# Patient Record
Sex: FEMALE | Race: WHITE | Employment: FULL TIME | URBAN - NONMETROPOLITAN AREA
[De-identification: names, ages, dates, MRNs, and addresses within clinical notes are randomized per-mention and may not be internally consistent; named-entity substitution may affect disease eponyms.]

---

## 2024-01-31 ENCOUNTER — APPOINTMENT (OUTPATIENT)
Dept: RADIOLOGY | Facility: CLINIC | Age: 69
End: 2024-01-31
Payer: MEDICARE

## 2024-01-31 ENCOUNTER — OFFICE VISIT (OUTPATIENT)
Dept: URGENT CARE | Facility: CLINIC | Age: 69
End: 2024-01-31
Payer: MEDICARE

## 2024-01-31 VITALS
HEIGHT: 59 IN | HEART RATE: 68 BPM | DIASTOLIC BLOOD PRESSURE: 63 MMHG | TEMPERATURE: 98.7 F | SYSTOLIC BLOOD PRESSURE: 112 MMHG | WEIGHT: 145 LBS | RESPIRATION RATE: 18 BRPM | BODY MASS INDEX: 29.23 KG/M2 | OXYGEN SATURATION: 100 %

## 2024-01-31 DIAGNOSIS — M79.672 LEFT FOOT PAIN: ICD-10-CM

## 2024-01-31 DIAGNOSIS — M79.672 LEFT FOOT PAIN: Primary | ICD-10-CM

## 2024-01-31 DIAGNOSIS — S92.355A CLOSED NONDISPLACED FRACTURE OF FIFTH METATARSAL BONE OF LEFT FOOT, INITIAL ENCOUNTER: ICD-10-CM

## 2024-01-31 PROCEDURE — 73630 X-RAY EXAM OF FOOT: CPT

## 2024-01-31 PROCEDURE — G0463 HOSPITAL OUTPT CLINIC VISIT: HCPCS

## 2024-01-31 PROCEDURE — 99204 OFFICE O/P NEW MOD 45 MIN: CPT

## 2024-01-31 RX ORDER — ESCITALOPRAM OXALATE 5 MG/1
5 TABLET ORAL DAILY
COMMUNITY

## 2024-01-31 RX ORDER — EVENING PRIMROSE OIL 500 MG
100 CAPSULE ORAL DAILY
COMMUNITY

## 2024-01-31 NOTE — LETTER
January 31, 2024     Patient: Sabine Romero   YOB: 1955   Date of Visit: 1/31/2024       To Whom it May Concern:    Sabine Romero was seen in my clinic on 1/31/2024. She may return to work as scheduled, but must be non-weightbearing due fracture in left foot.    If you have any questions or concerns, please don't hesitate to call.         Sincerely,          Reg Sullivan PA-C        CC: No Recipients

## 2024-01-31 NOTE — PROGRESS NOTES
"  [facility]        NAME: Sabine Romero is a 68 y.o. female  : 1955    MRN: [id number]  DATE: 2024  TIME: 12:46 PM    Assessment and Plan   Left foot pain [M79.672]  1. Left foot pain  XR foot 3+ vw left      2. Closed nondisplaced fracture of fifth metatarsal bone of left foot, initial encounter  Ambulatory Referral to Podiatry        There appears to be a fracture of the left fifth metatarsal of the mid to distal shaft.  Nondisplaced.  There also appears to be a small fracture towards the more proximal base of the fifth metatarsal.  Official read pending at time of discharge.  Orthopedic boot placed i and nstructed to be nonweightbearing.  Patient refused walker here in the clinic because she \"has a walker at home\".  Referral to podiatry given as well.  Recommended ibuprofen, ice, elevation.  Advised to follow-up with family doctor.  Go to the emergency room if any symptoms worsen.    Patient Instructions     Ibuprofen for pain/inflammation.  Make sure to take with food.  May alternate with Tylenol as needed.  Elevate foot with 1-2 pillows when resting.  Wear orthopedic boot and use walker as instructed-do not bear weight on left foot.  Follow-up with podiatry as instructed.  May apply ice 3-4 times a day for 10 to 15 minutes.  Use insulation around ice.  Go to the emergency room if any symptoms worsen.  Follow up with PCP in 3-5 days.  Proceed to  ER if symptoms worsen.    Chief Complaint     Chief Complaint   Patient presents with    Fall    Foot Pain     Left foot pain fell yesterday got her foot wrapped around the vacuum cord she twisted it pain is on the left side of foot and on the top and unable to get a shoe on.          History of Present Illness       68-year-old female presents to the clinic for left dorsal/lateral foot pain that began yesterday.  PT states she was vacuuming when the cord wrapped around her foot and she fell.  She did not fully fall to the ground, " "she was able to catch herself on a nearby object.  States that her foot twisted outwards.  No prior injuries or surgeries.  Applied ice and used a topical numbing cream.  Pain worse with ambulation.  Denies numbness, tingling, fever, chills, head injury, loss of consciousness.    Fall        Review of Systems   Review of Systems   Constitutional: Negative.    Respiratory: Negative.     Cardiovascular: Negative.    Musculoskeletal:  Positive for arthralgias and joint swelling.   Skin:         Bruising on foot.   Neurological: Negative.          Current Medications       Current Outpatient Medications:     escitalopram (LEXAPRO) 5 mg tablet, Take 5 mg by mouth daily, Disp: , Rfl:     Vitamin E 45 MG (100 UNIT) CAPS, Take 100 Units by mouth daily, Disp: , Rfl:     Current Allergies     Allergies as of 01/31/2024 - Reviewed 01/31/2024   Allergen Reaction Noted    Codeine Other (See Comments) 07/13/2020            The following portions of the patient's history were reviewed and updated as appropriate: allergies, current medications, past family history, past medical history, past social history, past surgical history and problem list.     History reviewed. No pertinent past medical history.    History reviewed. No pertinent surgical history.    History reviewed. No pertinent family history.      Medications have been verified.        Objective   /63   Pulse 68   Temp 98.7 °F (37.1 °C)   Resp 18   Ht 4' 11\" (1.499 m)   Wt 65.8 kg (145 lb)   SpO2 100%   BMI 29.29 kg/m²        Physical Exam     Physical Exam  Constitutional:       General: She is not in acute distress.     Appearance: Normal appearance. She is not ill-appearing, toxic-appearing or diaphoretic.   HENT:      Head: Normocephalic and atraumatic.   Cardiovascular:      Rate and Rhythm: Normal rate and regular rhythm.      Pulses: Normal pulses.      Heart sounds: Normal heart sounds.   Pulmonary:      Effort: Pulmonary effort is normal. No " respiratory distress.      Breath sounds: Normal breath sounds.   Musculoskeletal:      Cervical back: Normal range of motion and neck supple. No rigidity or tenderness.      Right ankle: Normal.      Right Achilles Tendon: Normal.      Left ankle: Normal.      Left Achilles Tendon: Normal.      Right foot: Normal.      Left foot: Normal range of motion and normal capillary refill. Swelling (minimal) and tenderness (There is mild to moderate tenderness to palpation over the dorsal mid left foot mostly on the lateral aspect.  tenderness to palpation over the mid to distal aspect of the left fifth metatarsal.  No deformity.  Mild limp due to pain.  No open wound.) present. No deformity. Normal pulse.        Legs:    Skin:     General: Skin is warm and dry.      Capillary Refill: Capillary refill takes less than 2 seconds.      Findings: Bruising (on dorsal/lateral aspect of left foot - mild) present.   Neurological:      General: No focal deficit present.      Mental Status: She is alert and oriented to person, place, and time.   Psychiatric:         Mood and Affect: Mood normal.

## 2024-01-31 NOTE — PATIENT INSTRUCTIONS
Ibuprofen for pain/inflammation.  Make sure to take with food.  May alternate with Tylenol as needed.  Elevate foot with 1-2 pillows when resting.  Wear orthopedic boot and use walker as instructed-do not bear weight on left foot.  Follow-up with podiatry as instructed.  May apply ice 3-4 times a day for 10 to 15 minutes.  Use insulation around ice.  Go to the emergency room if any symptoms worsen.  Follow up with PCP in 3-5 days.  Proceed to  ER if symptoms worsen.  Foot Fracture in Adults   WHAT YOU NEED TO KNOW:   A foot fracture is a break in a bone in your foot.       DISCHARGE INSTRUCTIONS:   Call your local emergency number (911 in the [place]) if:   You suddenly feel lightheaded and short of breath.    You have chest pain when you take a deep breath or cough.    You cough up blood.    Return to the emergency department if:   The pain in your injured foot gets worse even after you rest and take pain medicine.    The skin or toes of your foot become numb, swollen, cold, white, or blue.    You have more pain or swelling than you did before a cast was put on.    Your leg feels warm, tender, and painful. It may look swollen and red.    Call your doctor if:   You have a fever.    You have new sores around your boot, cast, or splint.    You have new or worsening trouble moving your foot.    You notice a foul smell coming from under your cast.    Your boot, cast, or splint gets damaged.    You have questions or concerns about your condition or care.    Medicines:  You may need any of the following:  Antibiotics  help treat or prevent an infection caused by bacteria.    NSAIDs , such as ibuprofen, help decrease swelling, pain, and fever. NSAIDs can cause stomach bleeding or kidney problems in certain people. If you take blood thinner medicine, always ask your healthcare provider if NSAIDs are safe for you. Always read the medicine label and follow directions.    Prescription pain medicine  may be given. Ask your  healthcare provider how to take this medicine safely. Some prescription pain medicines contain acetaminophen. Do not take other medicines that contain acetaminophen without talking to your healthcare provider. Too much acetaminophen may cause liver damage. Prescription pain medicine may cause constipation. Ask your healthcare provider how to prevent or treat constipation.     Take your medicine as directed.  Contact your healthcare provider if you think your medicine is not helping or if you have side effects. Tell your provider if you are allergic to any medicine. Keep a list of the medicines, vitamins, and herbs you take. Include the amounts, and when and why you take them. Bring the list or the pill bottles to follow-up visits. Carry your medicine list with you in case of an emergency.    Self-care:   Rest  your foot and avoid activities that cause pain.    Apply ice  to decrease swelling and pain, and to prevent tissue damage. Use an ice pack, or put crushed ice in a plastic bag. Cover it with a towel before you apply it. Use ice for 15 to 20 minutes every hour or as directed.    Elevate your foot  above the level of your heart as often as you can. This will help decrease swelling and pain. Prop your foot on pillows or blankets to keep it elevated comfortably.         Physical therapy  may be needed when your foot has healed. A physical therapist can teach you exercises to help improve movement and strength, and to decrease pain.    Cast or splint care:   Ask when it is okay to take a bath or shower. Do not let your cast or splint get wet. Before you bathe, cover the cast or splint with a plastic bag. Tape the bag to your skin above the splint to seal out water. Keep your foot out of the water in case the bag leaks.    Check the skin around your splint daily for any redness or open areas.    Do not use a sharp or pointed object to scratch your skin under the splint.    Do not remove your splint unless your  healthcare provider or orthopedic surgeon says it is okay.    Assistive devices:  You may be given a hard-soled shoe to wear while your foot is healing. You also may need to use crutches to help you walk while your foot heals. It is important to use your crutches correctly. Ask for more information about how to use crutches.  Follow up with your doctor or bone specialist as directed:  You may need to return to have your splint or stitches removed. You may also need to return for tests to make sure your foot is healing. Write down your questions so you remember to ask them during your visits.  © Copyright [employer] 2023 Information is for End User's use only and may not be sold, redistributed or otherwise used for commercial purposes.  The above information is an  only. It is not intended as medical advice for individual conditions or treatments. Talk to your doctor, nurse or pharmacist before following any medical regimen to see if it is safe and effective for you.

## 2025-02-26 ENCOUNTER — APPOINTMENT (OUTPATIENT)
Dept: RADIOLOGY | Facility: CLINIC | Age: 70
End: 2025-02-26
Payer: MEDICARE

## 2025-02-26 ENCOUNTER — OFFICE VISIT (OUTPATIENT)
Dept: URGENT CARE | Facility: CLINIC | Age: 70
End: 2025-02-26
Payer: MEDICARE

## 2025-02-26 VITALS
RESPIRATION RATE: 18 BRPM | HEART RATE: 55 BPM | SYSTOLIC BLOOD PRESSURE: 141 MMHG | TEMPERATURE: 97.7 F | OXYGEN SATURATION: 100 % | DIASTOLIC BLOOD PRESSURE: 91 MMHG

## 2025-02-26 DIAGNOSIS — S39.012A STRAIN OF LUMBAR REGION, INITIAL ENCOUNTER: ICD-10-CM

## 2025-02-26 DIAGNOSIS — S89.92XA INJURY OF LEFT KNEE, INITIAL ENCOUNTER: ICD-10-CM

## 2025-02-26 DIAGNOSIS — S80.02XA CONTUSION OF LEFT KNEE, INITIAL ENCOUNTER: Primary | ICD-10-CM

## 2025-02-26 PROCEDURE — 73564 X-RAY EXAM KNEE 4 OR MORE: CPT

## 2025-02-26 PROCEDURE — 99213 OFFICE O/P EST LOW 20 MIN: CPT | Performed by: FAMILY MEDICINE

## 2025-02-26 PROCEDURE — G0463 HOSPITAL OUTPT CLINIC VISIT: HCPCS | Performed by: FAMILY MEDICINE

## 2025-02-26 RX ORDER — BACLOFEN 10 MG/1
10 TABLET ORAL 3 TIMES DAILY
Qty: 30 TABLET | Refills: 0 | Status: SHIPPED | OUTPATIENT
Start: 2025-02-26 | End: 2025-03-08

## 2025-02-26 NOTE — PROGRESS NOTES
[facility]        NAME: Sabine Romero is a 69 y.o. female  : 1955    MRN: [id number]  DATE: 2025  TIME: 11:17 AM    Assessment and Plan   Contusion of left knee, initial encounter [S80.02XA]  1. Contusion of left knee, initial encounter  XR knee 4+ vw left injury      2. Strain of lumbar region, initial encounter  baclofen 10 mg tablet            Patient Instructions     Ibuprofen every 6 hours.    Ice for 20 to 30 minutes 3-4 times a day for the knee.    Heat for 20 to 30 minutes 3-4 times a day for the low back.    If no improvement in the next 2 days, consider a self-referral to physical therapy.    Follow up with PCP in 3-5 days.  Proceed to  ER if symptoms worsen.    If tests have been performed at [facility], our office will contact you with results if changes need to be made to the care plan discussed with you at the visit.  [facility]    Chief Complaint     Chief Complaint   Patient presents with   • Fall     Pt c/o left knee, left hand and back pain- fell yesterday 25         History of Present Illness       Fall (Pt c/o left knee, left hand and back pain- fell yesterday 25)  No medications given for pain.    Fall      Review of Systems   Review of Systems   Constitutional: Negative.    Musculoskeletal:  Positive for back pain.        Knee pain       Current Medications       Current Outpatient Medications:   •  baclofen 10 mg tablet, Take 1 tablet (10 mg total) by mouth 3 (three) times a day for 10 days, Disp: 30 tablet, Rfl: 0  •  escitalopram (LEXAPRO) 5 mg tablet, Take 5 mg by mouth daily, Disp: , Rfl:   •  Vitamin E 45 MG (100 UNIT) CAPS, Take 100 Units by mouth daily, Disp: , Rfl:     Current Allergies     Allergies as of 2025 - Reviewed 2025   Allergen Reaction Noted   • Codeine Other (See Comments) 2020            The following portions of the patient's history were reviewed and updated  as appropriate: allergies, current medications, past family history, past medical history, past social history, past surgical history and problem list.     History reviewed. No pertinent past medical history.    History reviewed. No pertinent surgical history.    History reviewed. No pertinent family history.      Medications have been verified.        Objective   /91   Pulse 55   Temp 97.7 °F (36.5 °C) (Temporal)   Resp 18   SpO2 100%   No LMP recorded.       Physical Exam     Physical Exam  Vitals and nursing note reviewed.   Constitutional:       Appearance: Normal appearance.   Musculoskeletal:      Lumbar back: Tenderness present. Decreased range of motion. Negative right straight leg raise test and negative left straight leg raise test.      Comments: Flexion 0 degrees.  Extension 10 to 15 degrees.  Sidebending within normal limits.  Increased muscle tone and pain to palpation of the fair paravertebral muscles on the right L3-S1, on the left L1-S1.   Neurological:      Mental Status: She is alert.

## 2025-02-26 NOTE — PATIENT INSTRUCTIONS
Ibuprofen every 6 hours.    Ice for 20 to 30 minutes 3-4 times a day for the knee.    Heat for 20 to 30 minutes 3-4 times a day for the low back.    If no improvement in the next 2 days, consider a self-referral to physical therapy.    Follow up with PCP in 3-5 days.  Proceed to  ER if symptoms worsen.

## 2025-02-26 NOTE — LETTER
February 26, 2025     Patient: Sabine Romero   YOB: 1955   Date of Visit: 2/26/2025       To Whom it May Concern:    Sabine Romero was seen in my clinic on 2/26/2025.     If you have any questions or concerns, please don't hesitate to call.         Sincerely,          CORY BOGGS        CC: No Recipients